# Patient Record
(demographics unavailable — no encounter records)

---

## 2024-11-22 NOTE — PHYSICAL EXAM
[Normal] : The patient is moving all extremities spontaneously without any gross neurologic deficits. They walk with a fluid nonantalgic gait. There are equal and symmetric deep tendon reflexes in the upper and lower extremities bilaterally. There is gross intact sensation to soft and light touch in the bilateral upper and lower extremities [Musculoskeletal All Normal] : normal gait for age, good posture, normal clinical alignment in upper and lower extremities, normal clinical alignment of the spine, full range of motion in bilateral upper and lower extremities [FreeTextEntry1] : Neurological examination reveals a grade 5/5 muscle power. Deep tendon reflexes are 1+ with ankle jerk and knee jerk.  The plantars are bilaterally down going.  Superficial abdominal reflexes are symmetric and intact.  The biceps and triceps reflexes are 1+.  The August test is negative.  Able to come up on heels and toes. Mild shoulder asymmetry with R>L upon standing.  Mild flank asymmetry.  Upon Joe's forward bend test, mild Right thoracic prominence noted.  Mild postural kyphosis, fully correctable on hyperextension  There is no hairy patch, lipoma, sinus in the back.  There is no pes cavus, asymmetry of calves, significant leg length discrepancy or significant cafe-au-lait spots. Abdominal reflexes in all 4 quadrants present.

## 2024-11-22 NOTE — DATA REVIEWED
[de-identified] : Scoliosis Xrays AP and lateral were ordered, done and then independently reviewed today [11/18/24]  .  Xrays in/out of brace were obtained. Curvature 9 degrees. Risser 4-5, Sandoval 7-8. Normal kyphosis and lordosis on lateral films. No spondylolisthesis or spondylosis noted. Disc spaces equal throughout the spine.  No pelvic inequity noted. kyphosis mild, postural

## 2024-11-22 NOTE — ASSESSMENT
[FreeTextEntry1] : MELLISA is a 13-year-old Female with a 9-degree nonstructural scoliosis. Kyphosis postural   Of note, patient's mother is present for the entirety of the interview, examination, and discussion of care plan, and thus represents a reliable, independent historian. [place] translation by fluent office staff member is offered and accepted by mother during encounter.   Clinical exam and imaging reviewed with parent and patient at length. Natural history discussed.  Child is 13 years of age, Risser 4-5, Sandoval 7-8. Patient has limited remaining skeletal growth potential.  Scoliosis can progress with time and growth.  Spinal asymmetry currently measures about 9 degrees.  Observation only has been recommended.  Natural history of spine deformity discussed. Risk of progression explained..   Spine deformity can cause back pain later on and also arthritis, though usually later.. Spine deformity can affect organ systems,such as lungs, less commonly heart and GI etc over time depending on curve size and progression.Deformity can progress with growth but can continue to progress later on based on the size of the curve. It can also effect patient's height due to the curve..It usually does not impact activities and has no limitations, however activities may be limited due to pain or rarely breathlessness with large curves. Scoliosis is usually not impacted by daily activities- sleeping position, sitting position, lifting heavy weights etc, however posture and back pain can be affected by some of these.Stretching, exercises, bone health and nutrition are important factors in the long run.Spine deformity may have genetics etiology and so siblings and progenies should be evaluated.For scoliosis, curves less than 25 degrees are usually managed with observation. Bracing is warranted for curves measuring greater than 25 degrees with skeletal growth remaining.  Braces do not correct curves permanently and there is a 30% risk brace failure. Braces are effective in limiting progression if there is one year of growth remaining. Surgery is recommended for scoliosis measuring greater than 45 degrees.   Parent served as the primary historian regarding the above information for this visit to corroborate the patient's history. Clinical exam and imaging reviewed with patient and family at length.We also discussed/instructed back, core strengthening and posture correction exercises and going over the proper form as well the need to be regular on a daily basis. Importance was discussed and instructions printed.  For kyphosis, curves 45-60 degrees are usually managed with observation. Bracing is warranted for curves measuring greater than 60-65 degrees with skeletal growth remaining. Braces may correct curves permanently but there is a risk of brace failure. Surgery is recommended for kyphosis measuring 65 degrees with pain or 70 degrees or more. Brace option soft vs TLSO discussed. Exercises shown and printed instructions give. Importance of compliance discussed. PT given. Natural history with progression over time explained. Follow up stressed. Correct posture while sitting, working, studying discussed.    As for postural kyphosis and back pain, I have recommended regular exercise for back and core strengthening and postural support.  Home exercise regimen with exercises to be done at least 5 days a week has also been recommended.  Home exercise handout sheet has been provided.  Activities as tolerated.   Patient is to follow up in 1 year for repeat scoliosis XR studies and interval clinical evaluation.    The Physician spent 45 minutes on HPI, Clinical exam, ordering/ reviewing all imaging, reviewing any existing record, reviewing findings and counseling patient to treatment, differentials, etiology, prognosis, natural history, implications on ADLs, activities limitations/modifications,  answering questions and addressing concerns, treatment goals and documenting in the EHR.

## 2024-11-22 NOTE — REASON FOR VISIT
[Initial Evaluation] : an initial evaluation [Mother] : mother [Patient] : patient [FreeTextEntry1] : Scoliosis Evaluation

## 2024-11-22 NOTE — DATA REVIEWED
[de-identified] : Scoliosis Xrays AP and lateral were ordered, done and then independently reviewed today [11/18/24]  .  Xrays in/out of brace were obtained. Curvature 9 degrees. Risser 4-5, Sandoval 7-8. Normal kyphosis and lordosis on lateral films. No spondylolisthesis or spondylosis noted. Disc spaces equal throughout the spine.  No pelvic inequity noted. kyphosis mild, postural

## 2024-11-22 NOTE — HISTORY OF PRESENT ILLNESS
[FreeTextEntry1] :  RUBEN is a 13-year-old Female who presents today for initial evaluation of her spine. Pediatrician noticed a small ATR on physical exam and referred her to us for further workup. Patient denies any recent fevers, chills or night sweats. Denies any recent trauma or injuries. She denies any back pain, radiating pain, numbness, tingling sensations, discomfort, weakness to the LE, radiating LE pain, or bladder/bowel dysfunction. She has been participating in all of her normal physical activities without restrictions or discomfort. Denies any family history of scoliosis. Menarche reported greater than 2 yrs ago, estimates age 10.

## 2024-11-22 NOTE — ASSESSMENT
[FreeTextEntry1] : MELLISA is a 13-year-old Female with a 9-degree nonstructural scoliosis. Kyphosis postural   Of note, patient's mother is present for the entirety of the interview, examination, and discussion of care plan, and thus represents a reliable, independent historian. [place] translation by fluent office staff member is offered and accepted by mother during encounter.   Clinical exam and imaging reviewed with parent and patient at length. Natural history discussed.  Child is 13 years of age, Risser 4-5, Sandoval 7-8. Patient has limited remaining skeletal growth potential.  Scoliosis can progress with time and growth.  Spinal asymmetry currently measures about 9 degrees.  Observation only has been recommended.  Natural history of spine deformity discussed. Risk of progression explained..   Spine deformity can cause back pain later on and also arthritis, though usually later.. Spine deformity can affect organ systems,such as lungs, less commonly heart and GI etc over time depending on curve size and progression.Deformity can progress with growth but can continue to progress later on based on the size of the curve. It can also effect patient's height due to the curve..It usually does not impact activities and has no limitations, however activities may be limited due to pain or rarely breathlessness with large curves. Scoliosis is usually not impacted by daily activities- sleeping position, sitting position, lifting heavy weights etc, however posture and back pain can be affected by some of these.Stretching, exercises, bone health and nutrition are important factors in the long run.Spine deformity may have genetics etiology and so siblings and progenies should be evaluated.For scoliosis, curves less than 25 degrees are usually managed with observation. Bracing is warranted for curves measuring greater than 25 degrees with skeletal growth remaining.  Braces do not correct curves permanently and there is a 30% risk brace failure. Braces are effective in limiting progression if there is one year of growth remaining. Surgery is recommended for scoliosis measuring greater than 45 degrees.   Parent served as the primary historian regarding the above information for this visit to corroborate the patient's history. Clinical exam and imaging reviewed with patient and family at length.We also discussed/instructed back, core strengthening and posture correction exercises and going over the proper form as well the need to be regular on a daily basis. Importance was discussed and instructions printed.  For kyphosis, curves 45-60 degrees are usually managed with observation. Bracing is warranted for curves measuring greater than 60-65 degrees with skeletal growth remaining. Braces may correct curves permanently but there is a risk of brace failure. Surgery is recommended for kyphosis measuring 65 degrees with pain or 70 degrees or more. Brace option soft vs TLSO discussed. Exercises shown and printed instructions give. Importance of compliance discussed. PT given. Natural history with progression over time explained. Follow up stressed. Correct posture while sitting, working, studying discussed.    As for postural kyphosis and back pain, I have recommended regular exercise for back and core strengthening and postural support.  Home exercise regimen with exercises to be done at least 5 days a week has also been recommended.  Home exercise handout sheet has been provided.  Activities as tolerated.   Patient is to follow up in 1 year for repeat scoliosis XR studies and interval clinical evaluation.    The Physician spent 45 minutes on HPI, Clinical exam, ordering/ reviewing all imaging, reviewing any existing record, reviewing findings and counseling patient to treatment, differentials, etiology, prognosis, natural history, implications on ADLs, activities limitations/modifications,  answering questions and addressing concerns, treatment goals and documenting in the EHR.